# Patient Record
Sex: FEMALE | Race: ASIAN | NOT HISPANIC OR LATINO | URBAN - METROPOLITAN AREA
[De-identification: names, ages, dates, MRNs, and addresses within clinical notes are randomized per-mention and may not be internally consistent; named-entity substitution may affect disease eponyms.]

---

## 2023-12-26 ENCOUNTER — EMERGENCY (EMERGENCY)
Facility: HOSPITAL | Age: 43
LOS: 1 days | Discharge: HOME | End: 2023-12-26
Attending: EMERGENCY MEDICINE
Payer: COMMERCIAL

## 2023-12-26 VITALS
RESPIRATION RATE: 20 BRPM | OXYGEN SATURATION: 98 % | HEART RATE: 84 BPM | WEIGHT: 104.94 LBS | SYSTOLIC BLOOD PRESSURE: 106 MMHG | HEIGHT: 62 IN | DIASTOLIC BLOOD PRESSURE: 73 MMHG | TEMPERATURE: 98 F

## 2023-12-26 VITALS
RESPIRATION RATE: 18 BRPM | SYSTOLIC BLOOD PRESSURE: 115 MMHG | OXYGEN SATURATION: 99 % | HEART RATE: 77 BPM | DIASTOLIC BLOOD PRESSURE: 67 MMHG | TEMPERATURE: 99 F

## 2023-12-26 LAB
ALBUMIN SERPL ELPH-MCNC: 4 G/DL — SIGNIFICANT CHANGE UP (ref 3.3–5)
ALBUMIN SERPL ELPH-MCNC: 4 G/DL — SIGNIFICANT CHANGE UP (ref 3.3–5)
ALP SERPL-CCNC: 64 U/L — SIGNIFICANT CHANGE UP (ref 40–120)
ALP SERPL-CCNC: 64 U/L — SIGNIFICANT CHANGE UP (ref 40–120)
ALT FLD-CCNC: 12 U/L — SIGNIFICANT CHANGE UP (ref 10–45)
ALT FLD-CCNC: 12 U/L — SIGNIFICANT CHANGE UP (ref 10–45)
ANION GAP SERPL CALC-SCNC: 9 MMOL/L — SIGNIFICANT CHANGE UP (ref 5–17)
ANION GAP SERPL CALC-SCNC: 9 MMOL/L — SIGNIFICANT CHANGE UP (ref 5–17)
AST SERPL-CCNC: 14 U/L — SIGNIFICANT CHANGE UP (ref 10–40)
AST SERPL-CCNC: 14 U/L — SIGNIFICANT CHANGE UP (ref 10–40)
BASOPHILS # BLD AUTO: 0.02 K/UL — SIGNIFICANT CHANGE UP (ref 0–0.2)
BASOPHILS # BLD AUTO: 0.02 K/UL — SIGNIFICANT CHANGE UP (ref 0–0.2)
BASOPHILS NFR BLD AUTO: 0.1 % — SIGNIFICANT CHANGE UP (ref 0–2)
BASOPHILS NFR BLD AUTO: 0.1 % — SIGNIFICANT CHANGE UP (ref 0–2)
BILIRUB SERPL-MCNC: 0.3 MG/DL — SIGNIFICANT CHANGE UP (ref 0.2–1.2)
BILIRUB SERPL-MCNC: 0.3 MG/DL — SIGNIFICANT CHANGE UP (ref 0.2–1.2)
BUN SERPL-MCNC: 7 MG/DL — SIGNIFICANT CHANGE UP (ref 7–23)
BUN SERPL-MCNC: 7 MG/DL — SIGNIFICANT CHANGE UP (ref 7–23)
CALCIUM SERPL-MCNC: 9 MG/DL — SIGNIFICANT CHANGE UP (ref 8.4–10.5)
CALCIUM SERPL-MCNC: 9 MG/DL — SIGNIFICANT CHANGE UP (ref 8.4–10.5)
CHLORIDE SERPL-SCNC: 105 MMOL/L — SIGNIFICANT CHANGE UP (ref 96–108)
CHLORIDE SERPL-SCNC: 105 MMOL/L — SIGNIFICANT CHANGE UP (ref 96–108)
CO2 SERPL-SCNC: 29 MMOL/L — SIGNIFICANT CHANGE UP (ref 22–31)
CO2 SERPL-SCNC: 29 MMOL/L — SIGNIFICANT CHANGE UP (ref 22–31)
CREAT SERPL-MCNC: 0.62 MG/DL — SIGNIFICANT CHANGE UP (ref 0.5–1.3)
CREAT SERPL-MCNC: 0.62 MG/DL — SIGNIFICANT CHANGE UP (ref 0.5–1.3)
EGFR: 112 ML/MIN/1.73M2 — SIGNIFICANT CHANGE UP
EGFR: 112 ML/MIN/1.73M2 — SIGNIFICANT CHANGE UP
EOSINOPHIL # BLD AUTO: 0.17 K/UL — SIGNIFICANT CHANGE UP (ref 0–0.5)
EOSINOPHIL # BLD AUTO: 0.17 K/UL — SIGNIFICANT CHANGE UP (ref 0–0.5)
EOSINOPHIL NFR BLD AUTO: 1.1 % — SIGNIFICANT CHANGE UP (ref 0–6)
EOSINOPHIL NFR BLD AUTO: 1.1 % — SIGNIFICANT CHANGE UP (ref 0–6)
FLUAV AG NPH QL: SIGNIFICANT CHANGE UP
FLUAV AG NPH QL: SIGNIFICANT CHANGE UP
FLUBV AG NPH QL: SIGNIFICANT CHANGE UP
FLUBV AG NPH QL: SIGNIFICANT CHANGE UP
GLUCOSE SERPL-MCNC: 96 MG/DL — SIGNIFICANT CHANGE UP (ref 70–99)
GLUCOSE SERPL-MCNC: 96 MG/DL — SIGNIFICANT CHANGE UP (ref 70–99)
HCT VFR BLD CALC: 36.3 % — SIGNIFICANT CHANGE UP (ref 34.5–45)
HCT VFR BLD CALC: 36.3 % — SIGNIFICANT CHANGE UP (ref 34.5–45)
HGB BLD-MCNC: 12 G/DL — SIGNIFICANT CHANGE UP (ref 11.5–15.5)
HGB BLD-MCNC: 12 G/DL — SIGNIFICANT CHANGE UP (ref 11.5–15.5)
IMM GRANULOCYTES NFR BLD AUTO: 0.5 % — SIGNIFICANT CHANGE UP (ref 0–0.9)
IMM GRANULOCYTES NFR BLD AUTO: 0.5 % — SIGNIFICANT CHANGE UP (ref 0–0.9)
LYMPHOCYTES # BLD AUTO: 1.65 K/UL — SIGNIFICANT CHANGE UP (ref 1–3.3)
LYMPHOCYTES # BLD AUTO: 1.65 K/UL — SIGNIFICANT CHANGE UP (ref 1–3.3)
LYMPHOCYTES # BLD AUTO: 10.5 % — LOW (ref 13–44)
LYMPHOCYTES # BLD AUTO: 10.5 % — LOW (ref 13–44)
MCHC RBC-ENTMCNC: 30.5 PG — SIGNIFICANT CHANGE UP (ref 27–34)
MCHC RBC-ENTMCNC: 30.5 PG — SIGNIFICANT CHANGE UP (ref 27–34)
MCHC RBC-ENTMCNC: 33.1 GM/DL — SIGNIFICANT CHANGE UP (ref 32–36)
MCHC RBC-ENTMCNC: 33.1 GM/DL — SIGNIFICANT CHANGE UP (ref 32–36)
MCV RBC AUTO: 92.4 FL — SIGNIFICANT CHANGE UP (ref 80–100)
MCV RBC AUTO: 92.4 FL — SIGNIFICANT CHANGE UP (ref 80–100)
MONOCYTES # BLD AUTO: 0.9 K/UL — SIGNIFICANT CHANGE UP (ref 0–0.9)
MONOCYTES # BLD AUTO: 0.9 K/UL — SIGNIFICANT CHANGE UP (ref 0–0.9)
MONOCYTES NFR BLD AUTO: 5.7 % — SIGNIFICANT CHANGE UP (ref 2–14)
MONOCYTES NFR BLD AUTO: 5.7 % — SIGNIFICANT CHANGE UP (ref 2–14)
NEUTROPHILS # BLD AUTO: 12.9 K/UL — HIGH (ref 1.8–7.4)
NEUTROPHILS # BLD AUTO: 12.9 K/UL — HIGH (ref 1.8–7.4)
NEUTROPHILS NFR BLD AUTO: 82.1 % — HIGH (ref 43–77)
NEUTROPHILS NFR BLD AUTO: 82.1 % — HIGH (ref 43–77)
NRBC # BLD: 0 /100 WBCS — SIGNIFICANT CHANGE UP (ref 0–0)
NRBC # BLD: 0 /100 WBCS — SIGNIFICANT CHANGE UP (ref 0–0)
PLATELET # BLD AUTO: 350 K/UL — SIGNIFICANT CHANGE UP (ref 150–400)
PLATELET # BLD AUTO: 350 K/UL — SIGNIFICANT CHANGE UP (ref 150–400)
POTASSIUM SERPL-MCNC: 4 MMOL/L — SIGNIFICANT CHANGE UP (ref 3.5–5.3)
POTASSIUM SERPL-MCNC: 4 MMOL/L — SIGNIFICANT CHANGE UP (ref 3.5–5.3)
POTASSIUM SERPL-SCNC: 4 MMOL/L — SIGNIFICANT CHANGE UP (ref 3.5–5.3)
POTASSIUM SERPL-SCNC: 4 MMOL/L — SIGNIFICANT CHANGE UP (ref 3.5–5.3)
PROT SERPL-MCNC: 7.8 G/DL — SIGNIFICANT CHANGE UP (ref 6–8.3)
PROT SERPL-MCNC: 7.8 G/DL — SIGNIFICANT CHANGE UP (ref 6–8.3)
RBC # BLD: 3.93 M/UL — SIGNIFICANT CHANGE UP (ref 3.8–5.2)
RBC # BLD: 3.93 M/UL — SIGNIFICANT CHANGE UP (ref 3.8–5.2)
RBC # FLD: 11.9 % — SIGNIFICANT CHANGE UP (ref 10.3–14.5)
RBC # FLD: 11.9 % — SIGNIFICANT CHANGE UP (ref 10.3–14.5)
RSV RNA NPH QL NAA+NON-PROBE: SIGNIFICANT CHANGE UP
RSV RNA NPH QL NAA+NON-PROBE: SIGNIFICANT CHANGE UP
S PYO AG SPEC QL IA: POSITIVE
S PYO AG SPEC QL IA: POSITIVE
SARS-COV-2 RNA SPEC QL NAA+PROBE: SIGNIFICANT CHANGE UP
SARS-COV-2 RNA SPEC QL NAA+PROBE: SIGNIFICANT CHANGE UP
SODIUM SERPL-SCNC: 143 MMOL/L — SIGNIFICANT CHANGE UP (ref 135–145)
SODIUM SERPL-SCNC: 143 MMOL/L — SIGNIFICANT CHANGE UP (ref 135–145)
WBC # BLD: 15.72 K/UL — HIGH (ref 3.8–10.5)
WBC # BLD: 15.72 K/UL — HIGH (ref 3.8–10.5)
WBC # FLD AUTO: 15.72 K/UL — HIGH (ref 3.8–10.5)
WBC # FLD AUTO: 15.72 K/UL — HIGH (ref 3.8–10.5)

## 2023-12-26 PROCEDURE — 71045 X-RAY EXAM CHEST 1 VIEW: CPT | Mod: 26

## 2023-12-26 PROCEDURE — 87637 SARSCOV2&INF A&B&RSV AMP PRB: CPT

## 2023-12-26 PROCEDURE — 99284 EMERGENCY DEPT VISIT MOD MDM: CPT | Mod: 25

## 2023-12-26 PROCEDURE — 71045 X-RAY EXAM CHEST 1 VIEW: CPT

## 2023-12-26 PROCEDURE — 85025 COMPLETE CBC W/AUTO DIFF WBC: CPT

## 2023-12-26 PROCEDURE — 99284 EMERGENCY DEPT VISIT MOD MDM: CPT

## 2023-12-26 PROCEDURE — 87880 STREP A ASSAY W/OPTIC: CPT

## 2023-12-26 PROCEDURE — 36415 COLL VENOUS BLD VENIPUNCTURE: CPT

## 2023-12-26 PROCEDURE — 96374 THER/PROPH/DIAG INJ IV PUSH: CPT

## 2023-12-26 PROCEDURE — 80053 COMPREHEN METABOLIC PANEL: CPT

## 2023-12-26 PROCEDURE — 87799 DETECT AGENT NOS DNA QUANT: CPT

## 2023-12-26 RX ORDER — CEPHALEXIN 500 MG
1 CAPSULE ORAL
Qty: 20 | Refills: 0
Start: 2023-12-26 | End: 2024-01-04

## 2023-12-26 RX ORDER — CEPHALEXIN 500 MG
500 CAPSULE ORAL ONCE
Refills: 0 | Status: COMPLETED | OUTPATIENT
Start: 2023-12-26 | End: 2023-12-26

## 2023-12-26 RX ORDER — SODIUM CHLORIDE 9 MG/ML
1000 INJECTION INTRAMUSCULAR; INTRAVENOUS; SUBCUTANEOUS ONCE
Refills: 0 | Status: COMPLETED | OUTPATIENT
Start: 2023-12-26 | End: 2023-12-26

## 2023-12-26 RX ORDER — KETOROLAC TROMETHAMINE 30 MG/ML
30 SYRINGE (ML) INJECTION ONCE
Refills: 0 | Status: DISCONTINUED | OUTPATIENT
Start: 2023-12-26 | End: 2023-12-26

## 2023-12-26 RX ADMIN — Medication 30 MILLIGRAM(S): at 14:32

## 2023-12-26 RX ADMIN — SODIUM CHLORIDE 1000 MILLILITER(S): 9 INJECTION INTRAMUSCULAR; INTRAVENOUS; SUBCUTANEOUS at 14:34

## 2023-12-26 RX ADMIN — Medication 500 MILLIGRAM(S): at 19:50

## 2023-12-26 NOTE — ED ADULT TRIAGE NOTE - PAIN: PRESENCE, MLM
Pt called checking on glocuse test strips.   Pharmacy did not get Rx must be sent electronically   Due to pt insurance  Please advise assumed presence of pain

## 2023-12-26 NOTE — ED PROVIDER NOTE - PHYSICAL EXAMINATION
GEN: Patient awake and alert. No acute distress, non-toxic.  Head: normocephalic, atraumatic.  Neck: Nontender, full ROM. No LAD.  Eyes: PERRLA b/l. EOMI, no scleral icterus, no conjunctival injection. Moist mucous membranes.  ENT: TM normal b/l. Throat: uvula midline, Right tonsil appears enlarged, there is posterior pharyngeal erythema, right exudate seen in the posterior pharynx.  CARDIAC: RRR. Normal S1, S2. No murmur, rubs, or gallops. No peripheral edema noted.  PULM: CTA B/L no wheeze, rales or rhonchi. No signs of respiratory distress, no accessory muscle usage or nasal flaring.  ABD: Soft, nontender, nondistended. No rebound, no involuntary guarding. BS x 4, no auscultated bruit. No HSM appreciated.  : No CVA tenderness, no suprapubic tenderness.  MSK: Moving all extremities spontaneously. 5/5 strength and full ROM in all extremities. No obvious deformity.  NEURO: A&Ox3, no focal neurological deficits, CN 2-12 grossly intact. Following simple commands. FTN and HTS coordination intact. Gait normal.  SKIN: Warm, dry, no rash, no lesions, no open wounds. No urticaria. GEN: Patient awake and alert. No acute distress, non-toxic.  Head: normocephalic, atraumatic. No tenderness over the parotid, no mass or tenderness.  Neck: Nontender, full ROM. No LAD.  Eyes: PERRLA b/l. EOMI, no scleral icterus, no conjunctival injection. Moist mucous membranes.  ENT: TM normal b/l. Throat: uvula midline, Right tonsil appears enlarged, there is posterior pharyngeal erythema, white exudate seen in the posterior pharynx.  CARDIAC: RRR. Normal S1, S2. No murmur, rubs, or gallops. No peripheral edema noted.  PULM: CTA B/L no wheeze, rales or rhonchi. No signs of respiratory distress, no accessory muscle usage or nasal flaring.  ABD: Soft, nontender, nondistended.  : No CVA tenderness  MSK: Moving all extremities spontaneously  NEURO: A&Ox3,  SKIN: Warm, dry

## 2023-12-26 NOTE — ED PROVIDER NOTE - OBJECTIVE STATEMENT
Kassandra Dhillon is a 43 y.o F Who presents to the ED with complaints of sore throat and painful swallowing onset approximately 8 days prior.  Initially patient states she experienced pain primarily with swallowing however over the last 3 days she has experienced increased pain even when not swallowing.  She endorses rhinorrhea, subjective fevers and an occasional cough however, denies N/V/D, CP, SOB, ABD pain, changes in urination, constipation, myalgias or arthralgias.  Patient has been taking Advil over the last 3 days 2/2 increased pain.  Last took Advil at 9 AM this morning, does also endorse taking Tylenol at 6:30 AM today.  Of note, children at home have similar symptoms. 43 y.o F Who presents to the ED with complaints of sore throat and painful swallowing onset approximately 8 days prior.  Initially patient states she experienced pain primarily with swallowing however over the last 3 days she has experienced increased pain even when not swallowing.  She endorses rhinorrhea, subjective fevers and an occasional cough however, denies N/V/D, CP, SOB, ABD pain, changes in urination, constipation, myalgias or arthralgias.  Patient has been taking Advil over the last 3 days 2/2 increased pain.  Last took Advil at 9 AM this morning, does also endorse taking Tylenol at 6:30 AM today.  Of note, children at home have similar symptoms.

## 2023-12-26 NOTE — ED PROVIDER NOTE - CLINICAL SUMMARY MEDICAL DECISION MAKING FREE TEXT BOX
Kassandra Dhillon is a 43 y.o F Who presents to the ED with complaints of sore throat and painful swallowing onset approximately 8 days prior. Given HPI and physical exam, concerning differentials include but not limited to viral etiology including COVID, flu, RSV.  Given enlarged right tonsil, pharyngeal erythema, and appearance of white exudates, strep throat is considered.  Will obtain basic CBC, CMP to evaluate for infectious etiology, will obtain flu, COVID swab as well as swab for strep a and mononucleosis.  Will provide IV fluids and analgesia. Kassandra Dhillon is a 43 y.o F Who presents to the ED with complaints of sore throat and painful swallowing onset approximately 8 days prior. Given HPI and physical exam, concerning differentials include but not limited to viral etiology including COVID, flu, RSV.  Given enlarged right tonsil, pharyngeal erythema, and appearance of white exudates, strep throat is considered. No tenderness over the mastoid or parotid. TM's clear bilaterally. Will obtain basic CBC, CMP to evaluate for infectious etiology, will obtain flu, COVID swab as well as swab for strep a and mononucleosis.  Will provide IV fluids and analgesia. a 43 y.o F Who presents to the ED with complaints of sore throat and painful swallowing onset approximately 8 days prior. Given HPI and physical exam, concerning differentials include but not limited to viral etiology including COVID, flu, RSV.    Given enlarged right tonsil, pharyngeal erythema, and appearance of white exudates, strep throat is considered. No tenderness over the mastoid or parotid. TM's clear bilaterally. Will obtain basic CBC, CMP to evaluate for infectious etiology, will obtain flu, COVID swab as well as swab for strep a and mononucleosis.  Will provide IV fluids and analgesia.and reassess ZR

## 2023-12-26 NOTE — ED PROVIDER NOTE - PATIENT PORTAL LINK FT
You can access the FollowMyHealth Patient Portal offered by [facility] by registering at the following website: [email or web address]. By joining "[employer]’s FollowMyHealth portal, you will also be able to view your health information using other applications (apps) compatible with our system. You can access the FollowMyHealth Patient Portal offered by [facility] by registering at the following website: [email or web address]. By joining [employer]’s FollowMyHealth portal, you will also be able to view your health information using other applications (apps) compatible with our system.

## 2023-12-26 NOTE — ED ADULT NURSE NOTE - NSFALLUNIVINTERV_ED_ALL_ED
Bed/Stretcher in lowest position, wheels locked, appropriate side rails in place/Call bell, personal items and telephone in reach/Instruct patient to call for assistance before getting out of bed/chair/stretcher/Non-slip footwear applied when patient is off stretcher/[place] to call system/Physically safe environment - no spills, clutter or unnecessary equipment/Purposeful proactive rounding/Room/bathroom lighting operational, light cord in reach Bed/Stretcher in lowest position, wheels locked, appropriate side rails in place/Call bell, personal items and telephone in reach/Instruct patient to call for assistance before getting out of bed/chair/stretcher/Non-slip footwear applied when patient is off stretcher/[place] to call system/Physically safe environment - no spills, clutter or unnecessary equipment/Purposeful proactive rounding/Room/bathroom lighting operational, light cord in reach

## 2023-12-26 NOTE — ED PROVIDER NOTE - NSFOLLOWUPINSTRUCTIONS_ED_ALL_ED_FT
You were seen in the emergency department for sore throat.      While here blood work, chest x-ray, flu and COVID swab as well as a strep swab was obtained.  Your lab work showed you have an elevation in white blood cell count which is indicative of infection.  Otherwise you do not have flu, RSV, COVID.  Your chest x-ray was normal.    If you develop fever, chills, chest pain, shortness of breath, or for any other questions or concerns please return to the emergency department.      Please follow-up with your primary care doctor within the next 1-3 days to ensure resolution of your symptoms. You were seen in the emergency department for sore throat.      While here blood work, chest x-ray, flu and COVID swab as well as a strep swab was obtained.  Your lab work showed you have an elevation in white blood cell count which is indicative of infection.  Otherwise you do not have flu, RSV, COVID.  Your chest x-ray was normal.    You were found to be positive for strep throat.     If you develop fever, chills, chest pain, shortness of breath, difficulty swallowing, or for any other questions or concerns please return to the emergency department.      Please follow-up with your primary care doctor within the next 1-3 days to ensure resolution of your symptoms. You were seen in the emergency department for sore throat.      While here blood work, chest x-ray, flu and COVID swab as well as a strep swab was obtained.  Your lab work showed you have an elevation in white blood cell count which is indicative of infection.  Otherwise you do not have flu, RSV, COVID.  Your chest x-ray was normal.    You were found to be positive for strep throat. You are being prescribed an antibiotic to your pharmacy.  Please take 1 pill by mouth twice per day for the next 10 days    If you develop fever, chills, chest pain, shortness of breath, difficulty swallowing, or for any other questions or concerns please return to the emergency department.      Please follow-up with your primary care doctor within the next 1-3 days to ensure resolution of your symptoms.

## 2023-12-26 NOTE — ED ADULT NURSE NOTE - OBJECTIVE STATEMENT
42 y/o F with no perinent medical hx  with c/o sore throat. Pt states she has had a sore throat for the past 8 days and has been painful to swallow, pt reports worsening in pain for the past three days. pt is  A&Ox4  able to follow all commands. Pulse, motor, sensation present and equal in all 4 extremities. Denies HA, dizziness, blurry vision. Respirations spontaneous and unlabored on room air. Denies SOB, dyspnea, cough, CP, palpitations.  Denies abd pain, N/V/D/C. Abd soft NT/ND. Denies urinary symptoms. Denies fever, chills. No sick contacts. Skin intact, warm, dry, normal for race. 44 y/o F with no perinent medical hx  with c/o sore throat. Pt states she has had a sore throat for the past 8 days and has been painful to swallow, pt reports worsening in pain for the past three days. pt is  A&Ox4  able to follow all commands. Pulse, motor, sensation present and equal in all 4 extremities. Denies HA, dizziness, blurry vision. Respirations spontaneous and unlabored on room air. Denies SOB, dyspnea, cough, CP, palpitations.  Denies abd pain, N/V/D/C. Abd soft NT/ND. Denies urinary symptoms. Denies fever, chills. No sick contacts. Skin intact, warm, dry, normal for race.

## 2023-12-26 NOTE — ED PROVIDER NOTE - PROGRESS NOTE DETAILS
Lab work thus far significant for leukocytosis: 15.72 otherwise WNL. Chest x-ray normal.  Flu COVID-negative.  At this time strep swab has been sent to lab pending results.  Patient otherwise resting in bed comfortably and reports improvement in her symptoms. Pending results of swab, patient may be discharged. Patient positive for strep a
